# Patient Record
Sex: MALE | Race: WHITE | NOT HISPANIC OR LATINO
[De-identification: names, ages, dates, MRNs, and addresses within clinical notes are randomized per-mention and may not be internally consistent; named-entity substitution may affect disease eponyms.]

---

## 2017-10-31 ENCOUNTER — APPOINTMENT (OUTPATIENT)
Dept: ORTHOPEDIC SURGERY | Facility: CLINIC | Age: 67
End: 2017-10-31

## 2019-08-10 ENCOUNTER — TRANSCRIPTION ENCOUNTER (OUTPATIENT)
Age: 69
End: 2019-08-10

## 2021-03-04 ENCOUNTER — APPOINTMENT (OUTPATIENT)
Dept: ORTHOPEDIC SURGERY | Facility: CLINIC | Age: 71
End: 2021-03-04
Payer: MEDICARE

## 2021-03-04 DIAGNOSIS — M65.341 TRIGGER FINGER, RIGHT RING FINGER: ICD-10-CM

## 2021-03-04 PROCEDURE — 99203 OFFICE O/P NEW LOW 30 MIN: CPT

## 2021-03-05 ENCOUNTER — NON-APPOINTMENT (OUTPATIENT)
Age: 71
End: 2021-03-05

## 2021-04-02 ENCOUNTER — OUTPATIENT (OUTPATIENT)
Dept: OUTPATIENT SERVICES | Facility: HOSPITAL | Age: 71
LOS: 1 days | End: 2021-04-02
Payer: MEDICARE

## 2021-04-02 VITALS
DIASTOLIC BLOOD PRESSURE: 82 MMHG | HEART RATE: 66 BPM | WEIGHT: 210.1 LBS | SYSTOLIC BLOOD PRESSURE: 154 MMHG | OXYGEN SATURATION: 96 % | TEMPERATURE: 98 F | RESPIRATION RATE: 14 BRPM | HEIGHT: 68 IN

## 2021-04-02 DIAGNOSIS — K11.1 HYPERTROPHY OF SALIVARY GLAND: Chronic | ICD-10-CM

## 2021-04-02 DIAGNOSIS — M65.342 TRIGGER FINGER, LEFT RING FINGER: ICD-10-CM

## 2021-04-02 DIAGNOSIS — Z87.39 PERSONAL HISTORY OF OTHER DISEASES OF THE MUSCULOSKELETAL SYSTEM AND CONNECTIVE TISSUE: ICD-10-CM

## 2021-04-02 DIAGNOSIS — M25.531 PAIN IN RIGHT WRIST: ICD-10-CM

## 2021-04-02 DIAGNOSIS — G56.02 CARPAL TUNNEL SYNDROME, LEFT UPPER LIMB: ICD-10-CM

## 2021-04-02 DIAGNOSIS — Z87.81 PERSONAL HISTORY OF (HEALED) TRAUMATIC FRACTURE: Chronic | ICD-10-CM

## 2021-04-02 DIAGNOSIS — S82.899A OTHER FRACTURE OF UNSPECIFIED LOWER LEG, INITIAL ENCOUNTER FOR CLOSED FRACTURE: Chronic | ICD-10-CM

## 2021-04-02 DIAGNOSIS — Z01.818 ENCOUNTER FOR OTHER PREPROCEDURAL EXAMINATION: ICD-10-CM

## 2021-04-02 DIAGNOSIS — Z98.890 OTHER SPECIFIED POSTPROCEDURAL STATES: Chronic | ICD-10-CM

## 2021-04-02 LAB
ANION GAP SERPL CALC-SCNC: 10 MMOL/L — SIGNIFICANT CHANGE UP (ref 5–17)
BUN SERPL-MCNC: 17 MG/DL — SIGNIFICANT CHANGE UP (ref 7–23)
CALCIUM SERPL-MCNC: 9.8 MG/DL — SIGNIFICANT CHANGE UP (ref 8.4–10.5)
CHLORIDE SERPL-SCNC: 101 MMOL/L — SIGNIFICANT CHANGE UP (ref 96–108)
CO2 SERPL-SCNC: 26 MMOL/L — SIGNIFICANT CHANGE UP (ref 22–31)
CREAT SERPL-MCNC: 0.94 MG/DL — SIGNIFICANT CHANGE UP (ref 0.5–1.3)
GLUCOSE SERPL-MCNC: 154 MG/DL — HIGH (ref 70–99)
HCT VFR BLD CALC: 46.1 % — SIGNIFICANT CHANGE UP (ref 39–50)
HGB BLD-MCNC: 15.6 G/DL — SIGNIFICANT CHANGE UP (ref 13–17)
MCHC RBC-ENTMCNC: 28.9 PG — SIGNIFICANT CHANGE UP (ref 27–34)
MCHC RBC-ENTMCNC: 33.8 GM/DL — SIGNIFICANT CHANGE UP (ref 32–36)
MCV RBC AUTO: 85.4 FL — SIGNIFICANT CHANGE UP (ref 80–100)
NRBC # BLD: 0 /100 WBCS — SIGNIFICANT CHANGE UP (ref 0–0)
PLATELET # BLD AUTO: 179 K/UL — SIGNIFICANT CHANGE UP (ref 150–400)
POTASSIUM SERPL-MCNC: 4.2 MMOL/L — SIGNIFICANT CHANGE UP (ref 3.5–5.3)
POTASSIUM SERPL-SCNC: 4.2 MMOL/L — SIGNIFICANT CHANGE UP (ref 3.5–5.3)
RBC # BLD: 5.4 M/UL — SIGNIFICANT CHANGE UP (ref 4.2–5.8)
RBC # FLD: 12.5 % — SIGNIFICANT CHANGE UP (ref 10.3–14.5)
SODIUM SERPL-SCNC: 137 MMOL/L — SIGNIFICANT CHANGE UP (ref 135–145)
WBC # BLD: 3.8 K/UL — SIGNIFICANT CHANGE UP (ref 3.8–10.5)
WBC # FLD AUTO: 3.8 K/UL — SIGNIFICANT CHANGE UP (ref 3.8–10.5)

## 2021-04-02 PROCEDURE — 85027 COMPLETE CBC AUTOMATED: CPT

## 2021-04-02 PROCEDURE — 80048 BASIC METABOLIC PNL TOTAL CA: CPT

## 2021-04-02 PROCEDURE — G0463: CPT

## 2021-04-02 RX ORDER — LIDOCAINE HCL 20 MG/ML
0.2 VIAL (ML) INJECTION ONCE
Refills: 0 | Status: DISCONTINUED | OUTPATIENT
Start: 2021-04-16 | End: 2021-04-30

## 2021-04-02 RX ORDER — ATORVASTATIN CALCIUM 80 MG/1
1 TABLET, FILM COATED ORAL
Qty: 0 | Refills: 0 | DISCHARGE

## 2021-04-02 RX ORDER — SODIUM CHLORIDE 9 MG/ML
3 INJECTION INTRAMUSCULAR; INTRAVENOUS; SUBCUTANEOUS EVERY 8 HOURS
Refills: 0 | Status: DISCONTINUED | OUTPATIENT
Start: 2021-04-16 | End: 2021-04-30

## 2021-04-02 NOTE — H&P PST ADULT - ATTENDING COMMENTS
Patient has bilateral carpal tunnel syndrome markedly symptomatic with exacerbations and virtually continuous paresthesias. Patient also has cervical radiculopathy. Patient has painful left ring trigger finger. Patient had adverse reaction to cortisone injection and therefore cortisone injection for trigger finger contraindicated.     I have explained treatment options at length in detail. Patient wishes to proceed with carpal tunnel release and left ring trigger finger release initially. Depending upon recovery the patient will have right carpal tunnel release shortly thereafter, but if recovery is slow it will be done at some later date.     Surgery for both left and right carpal tunnel syndrome is indicated because of symptoms refractory to conservative treatment, interfering with sleep, and activities of daily living. Because of the duration and severity of carpal tunnel syndrome, ongoing symptoms can be expected postoperatively. While the patient may see improvement, there is no assurance of this. The possibility of little improvement or of no improvement exists. Even though it is low, the possibility of worsening exists as well. Risk of injury to the median nerve, CRPS, persistent paresthesias, wound related pain, weakness, and many other factors, reviewed and discussed.     Surgery for left ring trigger finger is indicated because of symptoms refractory to conservative treatment. The patient has pain and interference with activities of daily living. While the patient may see improvement, the patient may not have complete range of motion or complete return to activities of daily living.      Postoperative hand therapy, and other treatment modalities may be required. A lengthy and detailed discussion has been held with the patient. The surgical plan, alternative treatments, and the associated risks, complications, limitations, and expectations have been discussed with the patient. Postoperative plan, need for exercising to regain motion and function, wound care, dressing care, activities, follow up, and possible need for hand therapy have been reviewed and discussed. In addition, the expectation of postop wound related pain, wound induration, swelling, weakness of , alteration of hand and finger use and function, and palmar and forearm tenderness were reviewed. In particular, the expectation of weakness, difficulty with daily activities, and wound induration often lasting six months have been stressed.      As noted above the plan is to perform a left carpal tunnel release and left ring trigger finger release initially, and right carpal tunnel release at a second operation. The interval between the 2 operations will tentatively be 2 weeks.     All questions have been answered. The patient has expressed understanding and acceptance of the above and has consented to surgery. Patient has bilateral carpal tunnel syndrome markedly symptomatic with exacerbations and virtually continuous paresthesias. Patient also has cervical radiculopathy. Patient has painful left ring trigger finger. Patient had adverse reaction to cortisone injection and therefore cortisone injection for trigger finger contraindicated.     I have explained treatment options at length in detail. Patient wishes to proceed with carpal tunnel release and left ring trigger finger release initially. Depending upon recovery the patient will have right carpal tunnel release shortly thereafter, but if recovery is slow it will be done at some later date.     Surgery for both left and right carpal tunnel syndrome is indicated because of symptoms refractory to conservative treatment, interfering with sleep, and activities of daily living. Because of the duration and severity of carpal tunnel syndrome, ongoing symptoms can be expected postoperatively. While the patient may see improvement, there is no assurance of this. The possibility of little improvement or of no improvement exists. Even though it is low, the possibility of worsening exists as well. Risk of injury to the median nerve, CRPS, persistent paresthesias, wound related pain, weakness, and many other factors, reviewed and discussed.     Surgery for left ring trigger finger is indicated because of symptoms refractory to conservative treatment. The patient has pain and interference with activities of daily living. While the patient may see improvement, the patient may not have complete range of motion or complete return to activities of daily living.      Postoperative hand therapy, and other treatment modalities may be required. A lengthy and detailed discussion has been held with the patient. The surgical plan, alternative treatments, and the associated risks, complications, limitations, and expectations have been discussed with the patient. Postoperative plan, need for exercising to regain motion and function, wound care, dressing care, activities, follow up, and possible need for hand therapy have been reviewed and discussed. In addition, the expectation of postop wound related pain, wound induration, swelling, weakness of , alteration of hand and finger use and function, and palmar and forearm tenderness were reviewed. In particular, the expectation of weakness, difficulty with daily activities, and wound induration often lasting six months have been stressed.      As noted above the plan is to perform a left carpal tunnel release and left ring trigger finger release initially, and right carpal tunnel release at a second operation. The interval between the 2 operations will tentatively be 2 weeks.     All questions have been answered. The patient has expressed understanding and acceptance of the above and has consented to surgery.  -----------------------------------------------------------  ADDENDUM April 30, 2021.     PRE-OP NOTE:     The patient has symptomatic right carpal tunnel syndrome.  Patient has constant numbness and tingling with exacerbations.  The patient has no active trigger fingers in the right hand.  Patient has done well after left carpal tunnel release 2 weeks ago.  Because of ongoing symptoms on the right interfering with sleep and ADL, surgery is indicated and recommended.  Because of chronicity and severity some degree of symptoms will continue.  Whether or not the patient will have full resolution of symptoms cannot be predicted, and I have explained this to the patient.  Risks, complications, limitations, expectations of surgical plan and alternatives reviewed again.  Patient agrees and accepts.  Patient has consented to right carpal tunnel release surgery.

## 2021-04-02 NOTE — H&P PST ADULT - ANESTHESIA, PREVIOUS REACTION, PROFILE
1971 during ankle surgery,  nausea only in 1985 during knee Sx, had regular Colon and EGD without issues/nausea/vomiting

## 2021-04-02 NOTE — H&P PST ADULT - HISTORY OF PRESENT ILLNESS
71 y/o F with PMHx of HTN, hypothyroid, arthritis, BPH, HDL, GERD, and a Hx of b/l hand carpel tunnel for many years and also with b/l hand trigger finger with worsening symptoms in the past year.    Pt reports numbness of the fingers with b/l arm pain left more than right. Seen by Dr. Alexander and scheduled for Left wrist carpal tunnel release & Left ring finger trigger release.  Recently with intermittent neck discomfort had an MRI and found with cervical stenosis with compression,  NeuSx recommended Sx but patient will wait until after hand surgery.    Denies Hx of Covid-19 infection, denies sick contact or recent travel.   Pt scheduled for stage 2 right carpal tunnel release on 4/30/21  Covid vaccine (Pf[employer]) x 1 on 3/14/21  Covid swab 4/13/21

## 2021-04-02 NOTE — H&P PST ADULT - NSICDXPROBLEM_GEN_ALL_CORE_FT
PROBLEM DIAGNOSES  Problem: Bilateral wrist pain  Assessment and Plan: Hx of b/l hand carpel tunnel for many years and also with b/l hand trigger finger with worsening symptoms in the past year.    Pt reports numbness of the fingers with b/l arm pain left more than right. Seen by Dr. Alexander and scheduled for Left wrist carpal tunnel release & Left ring finger trigger release.  Pt will see Dr. Jesús Prince Apt 4/12/21 for medical clearance    Problem: History of neck pain  Assessment and Plan: Recently with intermittent neck discomfort had an MRI and found with cervical stenosis with compression,  NeuSx recommended Sx but patient will wait until after hand surgery.

## 2021-04-02 NOTE — H&P PST ADULT - NSICDXPASTSURGICALHX_GEN_ALL_CORE_FT
PAST SURGICAL HISTORY:  Ankle fracture Left ankle internal fixation 1971    H/O fracture of leg LLE Fx s/p internal fixation 1971    S/P arthroscopy of knee Rt knee - 1985    Salivary gland enlargement s/p excision- 2005

## 2021-04-02 NOTE — H&P PST ADULT - NSICDXPASTMEDICALHX_GEN_ALL_CORE_FT
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 NSx: Dr. Persaud.    GERD (gastroesophageal reflux disease) On PPI    H/O carpal tunnel syndrome b/l hand carpel tunnel for many years and also with b/l hand trigger finger with worsening symptoms in the past year.  Schedule for Lf wrist carpal tunnel release & Lf ring finger trigger release.  Then, right hand 2 weeks after    History of neck pain Recently with intermittent neck discomfort had an MRI and found with cervical stenosis with compression,  NeuSx recommended Sx but patient will wait until after hand surgery.    HTN (hypertension) Controlled with meds    Hypothyroidism On synthroid

## 2021-04-12 DIAGNOSIS — Z01.818 ENCOUNTER FOR OTHER PREPROCEDURAL EXAMINATION: ICD-10-CM

## 2021-04-13 ENCOUNTER — APPOINTMENT (OUTPATIENT)
Dept: DISASTER EMERGENCY | Facility: CLINIC | Age: 71
End: 2021-04-13

## 2021-04-14 LAB — SARS-COV-2 N GENE NPH QL NAA+PROBE: NOT DETECTED

## 2021-04-15 ENCOUNTER — TRANSCRIPTION ENCOUNTER (OUTPATIENT)
Age: 71
End: 2021-04-15

## 2021-04-15 NOTE — ASU DISCHARGE PLAN (ADULT/PEDIATRIC) - NURSING INSTRUCTIONS
Tylenol/acetaminophen------AND/OR------Motrin/ibuprofen  as needed for pain/discomfort.  NEXT DOSE:  Tylenol  OK @  1:00pm this afternoon, if needed.  Please read and follow preprinted, MD-specific instruction sheet provided.  Follow up with MD as requested; call office for appointment.

## 2021-04-15 NOTE — ASU DISCHARGE PLAN (ADULT/PEDIATRIC) - ASU DC SPECIAL INSTRUCTIONSFT
Please follow instructions on sheet given by Dr. Paige. Please follow instructions on sheet given by Dr. Alexander.

## 2021-04-15 NOTE — ASU DISCHARGE PLAN (ADULT/PEDIATRIC) - CARE PROVIDER_API CALL
Madeleine Suárez)  [address], [facility] [address]  [place] [address]  Phone: [phone]  Fax: [phone]  Follow Up Time:    Ryan Alexander (MD)  Orthopaedic Surgery; Surgery of the Hand  [address] [facility], [facility] [address]  [place] [address]  Phone: [phone]  Fax: [phone]  Follow Up Time:

## 2021-04-16 ENCOUNTER — APPOINTMENT (OUTPATIENT)
Dept: ORTHOPEDIC SURGERY | Facility: HOSPITAL | Age: 71
End: 2021-04-16

## 2021-04-16 ENCOUNTER — OUTPATIENT (OUTPATIENT)
Dept: OUTPATIENT SERVICES | Facility: HOSPITAL | Age: 71
LOS: 1 days | End: 2021-04-16
Payer: MEDICARE

## 2021-04-16 VITALS
SYSTOLIC BLOOD PRESSURE: 114 MMHG | TEMPERATURE: 97 F | OXYGEN SATURATION: 100 % | DIASTOLIC BLOOD PRESSURE: 61 MMHG | HEART RATE: 60 BPM

## 2021-04-16 VITALS
TEMPERATURE: 98 F | DIASTOLIC BLOOD PRESSURE: 81 MMHG | HEART RATE: 65 BPM | WEIGHT: 210.1 LBS | OXYGEN SATURATION: 98 % | SYSTOLIC BLOOD PRESSURE: 156 MMHG | HEIGHT: 68 IN | RESPIRATION RATE: 16 BRPM

## 2021-04-16 DIAGNOSIS — K11.1 HYPERTROPHY OF SALIVARY GLAND: Chronic | ICD-10-CM

## 2021-04-16 DIAGNOSIS — Z87.81 PERSONAL HISTORY OF (HEALED) TRAUMATIC FRACTURE: Chronic | ICD-10-CM

## 2021-04-16 DIAGNOSIS — G56.02 CARPAL TUNNEL SYNDROME, LEFT UPPER LIMB: ICD-10-CM

## 2021-04-16 DIAGNOSIS — M65.342 TRIGGER FINGER, LEFT RING FINGER: ICD-10-CM

## 2021-04-16 DIAGNOSIS — S82.899A OTHER FRACTURE OF UNSPECIFIED LOWER LEG, INITIAL ENCOUNTER FOR CLOSED FRACTURE: Chronic | ICD-10-CM

## 2021-04-16 DIAGNOSIS — Z98.890 OTHER SPECIFIED POSTPROCEDURAL STATES: Chronic | ICD-10-CM

## 2021-04-16 PROCEDURE — 64721 CARPAL TUNNEL SURGERY: CPT | Mod: LT

## 2021-04-16 PROCEDURE — 26055 INCISE FINGER TENDON SHEATH: CPT | Mod: F3

## 2021-04-16 RX ORDER — FENTANYL CITRATE 50 UG/ML
25 INJECTION INTRAVENOUS
Refills: 0 | Status: DISCONTINUED | OUTPATIENT
Start: 2021-04-16 | End: 2021-04-16

## 2021-04-16 RX ORDER — SODIUM CHLORIDE 9 MG/ML
1000 INJECTION, SOLUTION INTRAVENOUS
Refills: 0 | Status: DISCONTINUED | OUTPATIENT
Start: 2021-04-16 | End: 2021-04-30

## 2021-04-16 RX ORDER — ONDANSETRON 8 MG/1
4 TABLET, FILM COATED ORAL ONCE
Refills: 0 | Status: DISCONTINUED | OUTPATIENT
Start: 2021-04-16 | End: 2021-04-30

## 2021-04-16 RX ORDER — OXYCODONE HYDROCHLORIDE 5 MG/1
5 TABLET ORAL ONCE
Refills: 0 | Status: DISCONTINUED | OUTPATIENT
Start: 2021-04-16 | End: 2021-04-16

## 2021-04-16 RX ORDER — CHLORHEXIDINE GLUCONATE 213 G/1000ML
1 SOLUTION TOPICAL ONCE
Refills: 0 | Status: COMPLETED | OUTPATIENT
Start: 2021-04-16 | End: 2021-04-16

## 2021-04-16 RX ADMIN — SODIUM CHLORIDE 3 MILLILITER(S): 9 INJECTION INTRAMUSCULAR; INTRAVENOUS; SUBCUTANEOUS at 06:36

## 2021-04-16 RX ADMIN — CHLORHEXIDINE GLUCONATE 1 APPLICATION(S): 213 SOLUTION TOPICAL at 05:43

## 2021-04-16 NOTE — PRE-ANESTHESIA EVALUATION ADULT - NSANTHOSAYNRD_GEN_A_CORE
No. TAIWO screening performed.  STOP BANG Legend: 0-2 = LOW Risk; 3-4 = INTERMEDIATE Risk; 5-8 = HIGH Risk
No. TAIWO screening performed.  STOP BANG Legend: 0-2 = LOW Risk; 3-4 = INTERMEDIATE Risk; 5-8 = HIGH Risk

## 2021-04-16 NOTE — ASU PATIENT PROFILE, ADULT - PSH
Ankle fracture  Left ankle internal fixation 1971  H/O fracture of leg  LLE Fx s/p internal fixation 1971  S/P arthroscopy of knee  Rt knee - 1985  Salivary gland enlargement  s/p excision- 2005

## 2021-04-16 NOTE — BRIEF OPERATIVE NOTE - NSICDXBRIEFPROCEDURE_GEN_ALL_CORE_FT
PROCEDURES:  Carpal tunnel release 16-Apr-2021 07:52:35  Gerald Olsen  Trigger finger release 16-Apr-2021 07:52:54  Gerald Olsen

## 2021-04-16 NOTE — PRE-ANESTHESIA EVALUATION ADULT - ANESTHESIA, PREVIOUS REACTION, PROFILE
1971 during ankle surgery,  nausea only in 1985 during knee Sx, had regular Colon and EGD without issues/nausea/vomiting
1971 during ankle surgery,  nausea only in 1985 during knee Sx, had regular Colon and EGD without issues/nausea/vomiting

## 2021-04-16 NOTE — ASU PATIENT PROFILE, ADULT - PMH
Arthritis    Littlejohn esophagus  Hx of littlejohn esophagus with frequent surveillance. NSx: Dr. Persaud.  GERD (gastroesophageal reflux disease)  On PPI  H/O carpal tunnel syndrome  b/l hand carpel tunnel for many years and also with b/l hand trigger finger with worsening symptoms in the past year.  Schedule for Lf wrist carpal tunnel release & Lf ring finger trigger release.  Then, right hand 2 weeks after  History of neck pain  Recently with intermittent neck discomfort had an MRI and found with cervical stenosis with compression,  NeuSx recommended Sx but patient will wait until after hand surgery.  HTN (hypertension)  Controlled with meds  Hypothyroidism  On synthroid

## 2021-04-21 PROBLEM — K21.9 GASTRO-ESOPHAGEAL REFLUX DISEASE WITHOUT ESOPHAGITIS: Chronic | Status: ACTIVE | Noted: 2021-04-02

## 2021-04-21 PROBLEM — E03.9 HYPOTHYROIDISM, UNSPECIFIED: Chronic | Status: ACTIVE | Noted: 2021-04-02

## 2021-04-21 PROBLEM — M19.90 UNSPECIFIED OSTEOARTHRITIS, UNSPECIFIED SITE: Chronic | Status: ACTIVE | Noted: 2021-04-02

## 2021-04-21 PROBLEM — Z87.39 PERSONAL HISTORY OF OTHER DISEASES OF THE MUSCULOSKELETAL SYSTEM AND CONNECTIVE TISSUE: Chronic | Status: ACTIVE | Noted: 2021-04-02

## 2021-04-21 PROBLEM — Z86.69 PERSONAL HISTORY OF OTHER DISEASES OF THE NERVOUS SYSTEM AND SENSE ORGANS: Chronic | Status: ACTIVE | Noted: 2021-04-02

## 2021-04-21 PROBLEM — K22.70 BARRETT'S ESOPHAGUS WITHOUT DYSPLASIA: Chronic | Status: ACTIVE | Noted: 2021-04-02

## 2021-04-21 PROBLEM — I10 ESSENTIAL (PRIMARY) HYPERTENSION: Chronic | Status: ACTIVE | Noted: 2021-04-02

## 2021-04-27 ENCOUNTER — APPOINTMENT (OUTPATIENT)
Dept: ORTHOPEDIC SURGERY | Facility: CLINIC | Age: 71
End: 2021-04-27
Payer: MEDICARE

## 2021-04-27 ENCOUNTER — APPOINTMENT (OUTPATIENT)
Dept: DISASTER EMERGENCY | Facility: CLINIC | Age: 71
End: 2021-04-27

## 2021-04-27 PROCEDURE — 99024 POSTOP FOLLOW-UP VISIT: CPT

## 2021-04-27 NOTE — HISTORY OF PRESENT ILLNESS
[de-identified] : Left CTR and ring finger trigger release 4/16/21 [de-identified] : Pt underwent surgery 4/16/21.\par Release of carpal tunnel, left wrist ([id number]).\par  Release trigger finger, left ring finger.\par Pt reports no triggering.  The exacerbations of numbness and tingling upon awakening have subsided.  The patient still has some decreased sensation in the long and ring fingertips.\par Patient was bothered a great deal by constipation which has finally corrected and the patient is doing well as far as GI system at this time.\par \par Patient has numbness and tingling in the right hand. No triggering in right hand.  Right carpal tunnel release is scheduled for April 30, 2021 [de-identified] : Left:\par Carpal tunnel and ring trigger release incisions wound is healed, clean and dry. No redness or sign of infection. Sutures removed.\par Full finger flexion extension without triggering\par Decreased sensation in the distal portion of median distribution fingers, thumb index long and ring fingers.\par Right: Abnormal light touch sensation median distribution, with paresthesias at rest.  Normal ulnar and radial motor and sensory..\par Right hand no triggering.  No additional pertinent positive contributory findings. [de-identified] : Patient has dramatic improvement left hand after carpal tunnel release and ring trigger finger release.  There is still some tingling in the median distribution but the night pain and exacerbations have largely subsided.\par Pt continues with numbness and tingling constantly and.  Patient is eager to proceed with right carpal tunnel release.  Frequent exacerbations due to carpal tunnel syndrome of the right hand.  Right carpal tunnel release is scheduled for April 30 [de-identified] : Left hand: Patient should progress cautiously to full use.  Wound care, especially carpal tunnel incision, is required to avoid dehiscence.  Steri-Strip applied and patient should change Steri-Strips to continue to protect the wound so does not gap.\par Surgery plan for right carpal tunnel release April 30.  I have again reviewed surgical plan, risks, complications, limitations expectations.  While some improvement is anticipated, ongoing numbness and tingling to some degree is expected.  Patient understands and accepts.

## 2021-04-29 ENCOUNTER — TRANSCRIPTION ENCOUNTER (OUTPATIENT)
Age: 71
End: 2021-04-29

## 2021-04-29 LAB — SARS-COV-2 N GENE NPH QL NAA+PROBE: NOT DETECTED

## 2021-04-29 RX ORDER — SODIUM CHLORIDE 9 MG/ML
1000 INJECTION, SOLUTION INTRAVENOUS
Refills: 0 | Status: DISCONTINUED | OUTPATIENT
Start: 2021-04-30 | End: 2021-05-14

## 2021-04-30 ENCOUNTER — APPOINTMENT (OUTPATIENT)
Dept: ORTHOPEDIC SURGERY | Facility: HOSPITAL | Age: 71
End: 2021-04-30

## 2021-04-30 ENCOUNTER — OUTPATIENT (OUTPATIENT)
Dept: OUTPATIENT SERVICES | Facility: HOSPITAL | Age: 71
LOS: 1 days | End: 2021-04-30
Payer: MEDICARE

## 2021-04-30 VITALS
RESPIRATION RATE: 16 BRPM | DIASTOLIC BLOOD PRESSURE: 88 MMHG | TEMPERATURE: 98 F | HEIGHT: 68 IN | OXYGEN SATURATION: 98 % | WEIGHT: 210.1 LBS | HEART RATE: 74 BPM | SYSTOLIC BLOOD PRESSURE: 153 MMHG

## 2021-04-30 VITALS
HEART RATE: 69 BPM | DIASTOLIC BLOOD PRESSURE: 74 MMHG | SYSTOLIC BLOOD PRESSURE: 120 MMHG | OXYGEN SATURATION: 100 % | RESPIRATION RATE: 19 BRPM | TEMPERATURE: 98 F

## 2021-04-30 DIAGNOSIS — Z98.890 OTHER SPECIFIED POSTPROCEDURAL STATES: Chronic | ICD-10-CM

## 2021-04-30 DIAGNOSIS — G56.01 CARPAL TUNNEL SYNDROME, RIGHT UPPER LIMB: ICD-10-CM

## 2021-04-30 DIAGNOSIS — K11.1 HYPERTROPHY OF SALIVARY GLAND: Chronic | ICD-10-CM

## 2021-04-30 DIAGNOSIS — Z87.81 PERSONAL HISTORY OF (HEALED) TRAUMATIC FRACTURE: Chronic | ICD-10-CM

## 2021-04-30 DIAGNOSIS — S82.899A OTHER FRACTURE OF UNSPECIFIED LOWER LEG, INITIAL ENCOUNTER FOR CLOSED FRACTURE: Chronic | ICD-10-CM

## 2021-04-30 PROCEDURE — 64721 CARPAL TUNNEL SURGERY: CPT | Mod: RT

## 2021-04-30 PROCEDURE — 64721 CARPAL TUNNEL SURGERY: CPT | Mod: 79,RT

## 2021-04-30 RX ORDER — LISINOPRIL 2.5 MG/1
1 TABLET ORAL
Qty: 0 | Refills: 0 | DISCHARGE

## 2021-04-30 RX ORDER — FLUTICASONE PROPIONATE 50 MCG
1 SPRAY, SUSPENSION NASAL
Qty: 0 | Refills: 0 | DISCHARGE

## 2021-04-30 RX ORDER — ERGOCALCIFEROL 1.25 MG/1
1 CAPSULE ORAL
Qty: 0 | Refills: 0 | DISCHARGE

## 2021-04-30 RX ORDER — PREGABALIN 225 MG/1
1 CAPSULE ORAL
Qty: 0 | Refills: 0 | DISCHARGE

## 2021-04-30 RX ORDER — LEVOTHYROXINE SODIUM 125 MCG
1 TABLET ORAL
Qty: 0 | Refills: 0 | DISCHARGE

## 2021-04-30 RX ORDER — OMEPRAZOLE 10 MG/1
1 CAPSULE, DELAYED RELEASE ORAL
Qty: 0 | Refills: 0 | DISCHARGE

## 2021-04-30 RX ORDER — ASPIRIN/CALCIUM CARB/MAGNESIUM 324 MG
1 TABLET ORAL
Qty: 0 | Refills: 0 | DISCHARGE

## 2021-04-30 RX ORDER — CELECOXIB 200 MG/1
1 CAPSULE ORAL
Qty: 0 | Refills: 0 | DISCHARGE

## 2021-04-30 RX ORDER — ATORVASTATIN CALCIUM 80 MG/1
1 TABLET, FILM COATED ORAL
Qty: 0 | Refills: 0 | DISCHARGE

## 2021-04-30 RX ORDER — GABAPENTIN 400 MG/1
1 CAPSULE ORAL
Qty: 0 | Refills: 0 | DISCHARGE

## 2021-04-30 NOTE — PRE-ANESTHESIA EVALUATION ADULT - MALLAMPATI CLASS
Right upper central incisor slightly chipped/Class I (easy) - visualization of the soft palate, fauces, uvula, and both anterior and posterior pillars

## 2021-04-30 NOTE — ASU DISCHARGE PLAN (ADULT/PEDIATRIC) - CARE PROVIDER_API CALL
Ryan Alexander (MD)  Orthopaedic Surgery; Surgery of the Hand  [address] [facility], [facility] [address]  [place] [address]  Phone: [phone]  Fax: [phone]  Follow Up Time:

## 2021-04-30 NOTE — ASU PREOP CHECKLIST - WEIGHT IN LBS
Occupational Therapy  Patient not seen in therapy today.     Pt. currently receiving RN care. Per discussion w/ RN, patient continues to be minimally sedated but does wake up to follow commands. At this time, RN is administering pain medication and providing other care w/ plan to remove chest tubes later today. RN requesting therapy to return at later time. OT will plan to reattempt as able/appropriate.        OBJECTIVE                      Therapy procedure time and total treatment time can be found documented on the Time Entry flowsheet   210.1

## 2021-05-13 ENCOUNTER — APPOINTMENT (OUTPATIENT)
Dept: ORTHOPEDIC SURGERY | Facility: CLINIC | Age: 71
End: 2021-05-13
Payer: MEDICARE

## 2021-05-13 DIAGNOSIS — G56.02 CARPAL TUNNEL SYNDROME, LEFT UPPER LIMB: ICD-10-CM

## 2021-05-13 DIAGNOSIS — G56.01 CARPAL TUNNEL SYNDROME, RIGHT UPPER LIMB: ICD-10-CM

## 2021-05-13 DIAGNOSIS — M65.342 TRIGGER FINGER, LEFT RING FINGER: ICD-10-CM

## 2021-05-13 PROCEDURE — 99024 POSTOP FOLLOW-UP VISIT: CPT
